# Patient Record
Sex: FEMALE | Race: BLACK OR AFRICAN AMERICAN | Employment: FULL TIME | URBAN - METROPOLITAN AREA
[De-identification: names, ages, dates, MRNs, and addresses within clinical notes are randomized per-mention and may not be internally consistent; named-entity substitution may affect disease eponyms.]

---

## 2020-03-16 ENCOUNTER — APPOINTMENT (OUTPATIENT)
Dept: ULTRASOUND IMAGING | Age: 45
End: 2020-03-16
Payer: COMMERCIAL

## 2020-03-16 ENCOUNTER — HOSPITAL ENCOUNTER (EMERGENCY)
Age: 45
Discharge: HOME | End: 2020-03-16
Attending: EMERGENCY MEDICINE
Payer: COMMERCIAL

## 2020-03-16 ENCOUNTER — APPOINTMENT (OUTPATIENT)
Dept: CT IMAGING | Age: 45
End: 2020-03-16
Payer: COMMERCIAL

## 2020-03-16 VITALS
WEIGHT: 145 LBS | RESPIRATION RATE: 16 BRPM | SYSTOLIC BLOOD PRESSURE: 99 MMHG | HEART RATE: 63 BPM | BODY MASS INDEX: 26.68 KG/M2 | OXYGEN SATURATION: 100 % | TEMPERATURE: 97.6 F | DIASTOLIC BLOOD PRESSURE: 64 MMHG | HEIGHT: 62 IN

## 2020-03-16 LAB
A/G RATIO: 0.9 (ref 1.1–2.2)
ALBUMIN SERPL-MCNC: 3.6 G/DL (ref 3.4–5)
ALP BLD-CCNC: 55 U/L (ref 40–129)
ALT SERPL-CCNC: 7 U/L (ref 10–40)
ANION GAP SERPL CALCULATED.3IONS-SCNC: 10 MMOL/L (ref 3–16)
AST SERPL-CCNC: 13 U/L (ref 15–37)
BASOPHILS ABSOLUTE: 0 K/UL (ref 0–0.2)
BASOPHILS RELATIVE PERCENT: 0.7 %
BILIRUB SERPL-MCNC: <0.2 MG/DL (ref 0–1)
BILIRUBIN URINE: NEGATIVE
BLOOD, URINE: ABNORMAL
BUN BLDV-MCNC: 9 MG/DL (ref 7–20)
CALCIUM SERPL-MCNC: 9.2 MG/DL (ref 8.3–10.6)
CHLORIDE BLD-SCNC: 105 MMOL/L (ref 99–110)
CLARITY: CLEAR
CO2: 23 MMOL/L (ref 21–32)
COLOR: YELLOW
CREAT SERPL-MCNC: 0.8 MG/DL (ref 0.6–1.1)
EOSINOPHILS ABSOLUTE: 0.2 K/UL (ref 0–0.6)
EOSINOPHILS RELATIVE PERCENT: 3 %
EPITHELIAL CELLS, UA: 1 /HPF (ref 0–5)
GFR AFRICAN AMERICAN: >60
GFR NON-AFRICAN AMERICAN: >60
GLOBULIN: 4.1 G/DL
GLUCOSE BLD-MCNC: 93 MG/DL (ref 70–99)
GLUCOSE URINE: NEGATIVE MG/DL
HCG QUALITATIVE: NEGATIVE
HCT VFR BLD CALC: 30.6 % (ref 36–48)
HEMOGLOBIN: 9.5 G/DL (ref 12–16)
HYALINE CASTS: 3 /LPF (ref 0–8)
KETONES, URINE: NEGATIVE MG/DL
LEUKOCYTE ESTERASE, URINE: NEGATIVE
LIPASE: 18 U/L (ref 13–60)
LYMPHOCYTES ABSOLUTE: 1.4 K/UL (ref 1–5.1)
LYMPHOCYTES RELATIVE PERCENT: 27.1 %
MCH RBC QN AUTO: 22.4 PG (ref 26–34)
MCHC RBC AUTO-ENTMCNC: 31.1 G/DL (ref 31–36)
MCV RBC AUTO: 72.3 FL (ref 80–100)
MICROSCOPIC EXAMINATION: YES
MONOCYTES ABSOLUTE: 0.3 K/UL (ref 0–1.3)
MONOCYTES RELATIVE PERCENT: 6.5 %
NEUTROPHILS ABSOLUTE: 3.3 K/UL (ref 1.7–7.7)
NEUTROPHILS RELATIVE PERCENT: 62.7 %
NITRITE, URINE: NEGATIVE
PDW BLD-RTO: 17.6 % (ref 12.4–15.4)
PH UA: 5.5 (ref 5–8)
PLATELET # BLD: 312 K/UL (ref 135–450)
PMV BLD AUTO: 8.2 FL (ref 5–10.5)
POTASSIUM REFLEX MAGNESIUM: 4.1 MMOL/L (ref 3.5–5.1)
PROTEIN UA: NEGATIVE MG/DL
RBC # BLD: 4.23 M/UL (ref 4–5.2)
RBC UA: 3 /HPF (ref 0–4)
SODIUM BLD-SCNC: 138 MMOL/L (ref 136–145)
SPECIFIC GRAVITY UA: 1.02 (ref 1–1.03)
TOTAL PROTEIN: 7.7 G/DL (ref 6.4–8.2)
URINE REFLEX TO CULTURE: ABNORMAL
URINE TYPE: ABNORMAL
UROBILINOGEN, URINE: 0.2 E.U./DL
WBC # BLD: 5.2 K/UL (ref 4–11)
WBC UA: 1 /HPF (ref 0–5)

## 2020-03-16 PROCEDURE — 2580000003 HC RX 258: Performed by: EMERGENCY MEDICINE

## 2020-03-16 PROCEDURE — 85025 COMPLETE CBC W/AUTO DIFF WBC: CPT

## 2020-03-16 PROCEDURE — 84703 CHORIONIC GONADOTROPIN ASSAY: CPT

## 2020-03-16 PROCEDURE — 36415 COLL VENOUS BLD VENIPUNCTURE: CPT

## 2020-03-16 PROCEDURE — 6360000004 HC RX CONTRAST MEDICATION: Performed by: EMERGENCY MEDICINE

## 2020-03-16 PROCEDURE — 99284 EMERGENCY DEPT VISIT MOD MDM: CPT

## 2020-03-16 PROCEDURE — 93975 VASCULAR STUDY: CPT

## 2020-03-16 PROCEDURE — 81001 URINALYSIS AUTO W/SCOPE: CPT

## 2020-03-16 PROCEDURE — 96374 THER/PROPH/DIAG INJ IV PUSH: CPT

## 2020-03-16 PROCEDURE — 80053 COMPREHEN METABOLIC PANEL: CPT

## 2020-03-16 PROCEDURE — 76830 TRANSVAGINAL US NON-OB: CPT

## 2020-03-16 PROCEDURE — 74177 CT ABD & PELVIS W/CONTRAST: CPT

## 2020-03-16 PROCEDURE — 6360000002 HC RX W HCPCS: Performed by: EMERGENCY MEDICINE

## 2020-03-16 PROCEDURE — 83690 ASSAY OF LIPASE: CPT

## 2020-03-16 PROCEDURE — 76856 US EXAM PELVIC COMPLETE: CPT

## 2020-03-16 RX ORDER — ONDANSETRON 2 MG/ML
4 INJECTION INTRAMUSCULAR; INTRAVENOUS ONCE
Status: DISCONTINUED | OUTPATIENT
Start: 2020-03-16 | End: 2020-03-16 | Stop reason: HOSPADM

## 2020-03-16 RX ORDER — DOCUSATE SODIUM 100 MG/1
100 CAPSULE, LIQUID FILLED ORAL 2 TIMES DAILY
Qty: 30 CAPSULE | Refills: 0 | Status: SHIPPED | OUTPATIENT
Start: 2020-03-16

## 2020-03-16 RX ORDER — MORPHINE SULFATE 4 MG/ML
4 INJECTION, SOLUTION INTRAMUSCULAR; INTRAVENOUS ONCE
Status: DISCONTINUED | OUTPATIENT
Start: 2020-03-16 | End: 2020-03-16 | Stop reason: HOSPADM

## 2020-03-16 RX ORDER — KETOROLAC TROMETHAMINE 30 MG/ML
15 INJECTION, SOLUTION INTRAMUSCULAR; INTRAVENOUS ONCE
Status: COMPLETED | OUTPATIENT
Start: 2020-03-16 | End: 2020-03-16

## 2020-03-16 RX ORDER — 0.9 % SODIUM CHLORIDE 0.9 %
1000 INTRAVENOUS SOLUTION INTRAVENOUS ONCE
Status: COMPLETED | OUTPATIENT
Start: 2020-03-16 | End: 2020-03-16

## 2020-03-16 RX ORDER — POLYETHYLENE GLYCOL 3350 17 G/17G
17 POWDER, FOR SOLUTION ORAL DAILY
Qty: 1 BOTTLE | Refills: 0 | Status: SHIPPED | OUTPATIENT
Start: 2020-03-16 | End: 2020-03-23

## 2020-03-16 RX ADMIN — KETOROLAC TROMETHAMINE 15 MG: 30 INJECTION, SOLUTION INTRAMUSCULAR at 10:28

## 2020-03-16 RX ADMIN — IOPAMIDOL 75 ML: 755 INJECTION, SOLUTION INTRAVENOUS at 10:57

## 2020-03-16 RX ADMIN — SODIUM CHLORIDE 1000 ML: 9 INJECTION, SOLUTION INTRAVENOUS at 10:28

## 2020-03-16 ASSESSMENT — PAIN DESCRIPTION - PAIN TYPE: TYPE: ACUTE PAIN

## 2020-03-16 ASSESSMENT — PAIN SCALES - GENERAL
PAINLEVEL_OUTOF10: 9
PAINLEVEL_OUTOF10: 9
PAINLEVEL_OUTOF10: 6

## 2020-03-16 ASSESSMENT — PAIN DESCRIPTION - LOCATION: LOCATION: ABDOMEN

## 2020-03-16 ASSESSMENT — PAIN DESCRIPTION - ORIENTATION: ORIENTATION: RIGHT;LOWER

## 2020-03-16 NOTE — ED NOTES
Pt to ED from home c/o abd pain since last week. Pt states it is currently her cycle and states she thought it was cramps. Pt states pain is worse than usual.  Pt reports RLQ abd pain, described as sharp, rated 9/10. Pt denies fever, chills, n/v/d. Bowel sounds active to all quadrants. Lung sounds clear. Pt calm and cooperative.       Kalyani Whitley RN  03/16/20 3531

## 2020-03-16 NOTE — PROGRESS NOTES
Da Ibrahim aware of patient traveling to [place]. [place] called over looking for patient , will take patient to [address] [facility] after [address]. RN aware of plan.

## 2020-03-16 NOTE — ED PROVIDER NOTES
eMERGENCY dEPARTMENT eNCOUnter      PtName: Kayla Stover  MRN: [id number]  Kyra 1975  Date of evaluation: 3/16/2020  Provider: Eugenia Hall, [address] [place] [address]       Chief Complaint   Patient presents with    Abdominal Pain     Pt reporting RLQ pain off and on for years. Reports increase in pain over the last few days. sent here by urgent care         HISTORY OF PRESENT ILLNESS   (Location/Symptom, Timing/Onset,Context/Setting, Quality, Duration, Modifying Factors, Severity) Note limiting factors. HPI    April Joann Alberto is a 38 y.o. female who presents to the emergency department with chief complaint of right lower quad abdominal pain for the last 3 days. Sharp, 9/10, waxing and waning. No nausea, vomiting or diarrhea. Currently having vaginal bleeding but on her menstrual cycle. No discharge. Denies history of STDs. Has had intermittent pain like this before. Admits to a history of hard stools recently. Nursing Notes were reviewed. REVIEW OF SYSTEMS    (2+ forlevel 4; 10+ for level 5)     Review of Systems  See [facility] for further details. Complete 10 point review of all systems performed and is otherwise negative unless noted above. PAST MEDICAL HISTORY   History reviewed. No pertinent past medical history. SURGICAL HISTORY       Past Surgical History:   Procedure Laterality Date    FOOT SURGERY      right foot cyst removed       CURRENT MEDICATIONS       Previous Medications    CEPHALEXIN (KEFLEX) 500 MG CAPSULE    Take 1 capsule by mouth 4 times daily    NAPROXEN (NAPROSYN) 500 MG TABLET    Take 1 tablet by mouth 2 times daily as needed for Pain Take with food       ALLERGIES     Patient has no known allergies. FAMILY HISTORY     History reviewed. No pertinent family history.        SOCIAL HISTORY       Social History     Socioeconomic History    Marital status: Single     Spouse name: None    Number of children: None    Years of education: None    Highest education reactive to light. Neck:      Musculoskeletal: Normal range of motion and neck supple. Cardiovascular:      Rate and Rhythm: Normal rate and regular rhythm. Pulses: Normal pulses. Heart sounds: Normal heart sounds. No murmur. No friction rub. No gallop. Pulmonary:      Effort: Pulmonary effort is normal. No respiratory distress. Breath sounds: Normal breath sounds. No stridor. No wheezing, rhonchi or rales. Chest:      Chest wall: No tenderness. Abdominal:      General: Abdomen is flat. There is no distension. Palpations: There is no mass. Tenderness: There is abdominal tenderness (Right lower quadrant). There is no guarding or rebound. Hernia: No hernia is present. Comments: Questionable palpation of hard stool throughout colon   Musculoskeletal: Normal range of motion. General: No swelling, tenderness or deformity. Right lower leg: No edema. Left lower leg: No edema. Skin:     General: Skin is warm and dry. Capillary Refill: Capillary refill takes less than 2 seconds. Coloration: Skin is not jaundiced or pale. Findings: No bruising, erythema or rash. Neurological:      General: No focal deficit present. Mental Status: She is alert and oriented to person, place, and time. Cranial Nerves: No cranial nerve deficit. Sensory: No sensory deficit. Motor: No weakness. Psychiatric:         Mood and Affect: Mood normal.         Behavior: Behavior normal.         Thought Content: Thought content normal.         Judgment: Judgment normal.         DIAGNOSTIC RESULTS       RADIOLOGY (Per Emergency Physician):      Interpretation per the Radiologist below, if available at the time of this note:  Us Non Ob Transvaginal    Result Date: 3/16/2020  EXAMINATION: PELVIC ULTRASOUND; DOPPLER EVALUATION OF THE PELVIS 3/16/2020 TECHNIQUE: Transvaginal pelvic ultrasound was performed.; DOPPLER ULTRASOUND OF THE PELVIS  color Doppler was RLQ pain TECHNOLOGIST PROVIDED HISTORY: Reason for exam:->RLQ pain Reason for Exam: r/o ov torsion  RLQ pain Acuity: Acute Type of Exam: Initial Additional signs and symptoms: hx fibroids Relevant Medical/Surgical History: abd pelvic pain/pt currently menstrating ; ORDERING SYSTEM PROVIDED HISTORY: RLQ pain TECHNOLOGIST PROVIDED HISTORY: Reason for exam:->RLQ pain Reason for Exam: r/o ov torison   RLQ  pain Acuity: Acute Type of Exam: Initial Additional signs and symptoms: hx fibroids/abd pelvic pain/patient currently menstruating Relevant Medical/Surgical History: ovarian doppler charge ; ORDERING SYSTEM PROVIDED HISTORY: RLQ pain TECHNOLOGIST PROVIDED HISTORY: Reason for exam:->RLQ pain Reason for exam:->r/o ov torsion Reason for Exam: R/o ov torsion   RLQ abd pain Acuity: Acute Type of Exam: Initial Additional signs and symptoms: hx fibroids Relevant Medical/Surgical History: abd pelvic pain/patient currently menstruating FINDINGS: Measurements: Uterus:  16.7 x 9.8 x 12.2 cm Endometrial stripe:  2.1 cm Right Ovary:  2.9 x 2.4 x 2.9 cm Left Ovary:  3.2 x 1.5 x 1.7 cm Ultrasound Findings: Uterus: Multiple fibroids are seen. Uterus is enlarged. Endometrial stripe: Endometrial stripe is thickened. Trace free fluid is seen in the endometrial canal Right Ovary: Morphology appears normal.  Flow is seen Left Ovary:  Morphology appears normal.  Flow is seen. Free Fluid: Small amount of free fluid is seen in the pelvis     Normal morphology of the ovaries. Flow is seen in the ovaries. Enlarged uterus with multiple fibroids. Trace fluid in the endometrial canal     Ct Abdomen Pelvis W Iv Contrast Additional Contrast? None    Result Date: 3/16/2020  EXAMINATION: CT OF THE ABDOMEN AND PELVIS WITH CONTRAST 3/16/2020 10:56 am TECHNIQUE: CT of the abdomen and pelvis was performed with the administration of intravenous contrast. Multiplanar reformatted images are provided for review.  Dose modulation, iterative reconstruction, pelvic pain/patient currently menstruating Relevant Medical/Surgical History: ovarian doppler charge ; ORDERING SYSTEM PROVIDED HISTORY: RLQ pain TECHNOLOGIST PROVIDED HISTORY: Reason for exam:->RLQ pain Reason for exam:->r/o ov torsion Reason for Exam: R/o ov torsion   RLQ abd pain Acuity: Acute Type of Exam: Initial Additional signs and symptoms: hx fibroids Relevant Medical/Surgical History: abd pelvic pain/patient currently menstruating FINDINGS: Measurements: Uterus:  16.7 x 9.8 x 12.2 cm Endometrial stripe:  2.1 cm Right Ovary:  2.9 x 2.4 x 2.9 cm Left Ovary:  3.2 x 1.5 x 1.7 cm Ultrasound Findings: Uterus: Multiple fibroids are seen. Uterus is enlarged. Endometrial stripe: Endometrial stripe is thickened. Trace free fluid is seen in the endometrial canal Right Ovary: Morphology appears normal.  Flow is seen Left Ovary:  Morphology appears normal.  Flow is seen. Free Fluid: Small amount of free fluid is seen in the pelvis     Normal morphology of the ovaries. Flow is seen in the ovaries. Enlarged uterus with multiple fibroids.  Trace fluid in the endometrial canal       LABS:  Labs Reviewed   CBC WITH AUTO DIFFERENTIAL - Abnormal; Notable for the following components:       Result Value    Hemoglobin 9.5 (*)     Hematocrit 30.6 (*)     MCV 72.3 (*)     MCH 22.4 (*)     RDW 17.6 (*)     All other components within normal limits    Narrative:     Performed at:  [facility] [address], [place], [address]   Phone [phone]   COMPREHENSIVE METABOLIC PANEL W/ REFLEX TO MG FOR LOW K - Abnormal; Notable for the following components:    Albumin/Globulin Ratio 0.9 (*)     ALT 7 (*)     AST 13 (*)     All other components within normal limits    Narrative:     Performed at:  [facility]  [address] [facility], [place] [address]   Phone [phone]   URINE RT REFLEX TO CULTURE - Abnormal; Notable for the following components:    Blood, Urine TRACE (*)     All other components within normal limits    Narrative:     Performed at:  [facility]  [address],  [place], [address]   Phone [phone]   LIPASE    Narrative:     Performed at:  [facility]  [address],  [place], [address]   Phone [phone]   HCG, SERUM, QUALITATIVE    Narrative:     Performed at:  [facility]  [address],  [place], [address]   Phone [phone]   MICROSCOPIC URINALYSIS    Narrative:     Performed at:  [facility]  [address] [facility],  [place], [address]   Phone [phone]       All other labs were within normal range or not returned as of this dictation. EMERGENCY DEPARTMENT COURSE and DIFFERENTIAL DIAGNOSIS/MDM:   Vitals:    Vitals:    03/16/20 1200 03/16/20 1309 03/16/20 1330 03/16/20 1335   BP:  103/62 99/64    Pulse:  57  63   Resp:       Temp:       TempSrc:       SpO2: 100% 99% 100%    Weight:       Height:           Medications   morphine injection 4 mg (4 mg Intravenous Not Given 3/16/20 1156)   ondansetron (ZOFRAN) injection 4 mg (4 mg Intravenous Not Given 3/16/20 1157)   ketorolac (TORADOL) injection 15 mg (15 mg Intravenous Given 3/16/20 1028)   0.9 % sodium chloride bolus (0 mLs Intravenous Stopped 3/16/20 1152)   iopamidol (ISOVUE-370) 76 % injection 75 mL (75 mLs Intravenous Given 3/16/20 1057)       MDM. Abdominal labs, pelvic ultrasound, CT abdomen pelvis ordered. Toradol ordered as well as IV fluids. Patient currently denies nausea and vomiting. Imaging is negative outside of fibroid uterus which is chronic for the patient. Repeat abdominal exam is nonsurgical.  Clinically suspect constipation. Will place on bowel regimen. Patient given strict return precautions.     Patient instructed to follow up with their primary care doctor in one-two days and return to the emergency department if worsening of the condition or any other concerns. Please see discharge instructions for further delineation regarding the specific discharge instructions explained and given to the patient. CONSULTS:  None    PROCEDURES:  Unless otherwise noted below, none     Procedures    FINAL IMPRESSION      1. Right lower quadrant abdominal pain    2. Constipation, unspecified constipation type          DISPOSITION/PLAN   DISPOSITION Decision To Discharge 03/16/2020 01:21:23 PM      PATIENT REFERRED TO:  Ryan Beckman, [address] [facility]  [phone]    Schedule an appointment as soon as possible for a visit       [facility] Emergency Department  [address] [place] [address]  [phone]    If symptoms worsen      DISCHARGE MEDICATIONS:  New Prescriptions    DOCUSATE SODIUM (COLACE) 100 MG CAPSULE    Take 1 capsule by mouth 2 times daily    POLYETHYLENE GLYCOL (MIRALAX) POWDER    Take 17 g by mouth daily for 7 days PRN constipation          (Please note:  Portions of this note were completed with a voice recognition program. Efforts were made to edit the dictations but occasionally words and phrases are mis-transcribed.)    Form v2016. J.[id number]    Tamika Espinosa DO (electronically signed)  Emergency Medicine Provider              Ania Harvey DO  03/16/20 6816

## 2020-03-16 NOTE — ED NOTES
Pt discharged home, discharge instructions reviewed, pt verbalized understanding. Pt ambulatory with a steady gait, advised to return with any concerns.       Nav Garcia RN  03/16/20 9161